# Patient Record
Sex: MALE | Race: WHITE
[De-identification: names, ages, dates, MRNs, and addresses within clinical notes are randomized per-mention and may not be internally consistent; named-entity substitution may affect disease eponyms.]

---

## 2017-01-23 ENCOUNTER — HOSPITAL ENCOUNTER (OUTPATIENT)
Dept: HOSPITAL 45 - C.LAB1850 | Age: 82
Discharge: HOME | End: 2017-01-23
Attending: FAMILY MEDICINE
Payer: COMMERCIAL

## 2017-01-23 DIAGNOSIS — E78.5: ICD-10-CM

## 2017-01-23 DIAGNOSIS — R73.03: ICD-10-CM

## 2017-01-23 DIAGNOSIS — I25.10: ICD-10-CM

## 2017-01-23 DIAGNOSIS — D72.820: Primary | ICD-10-CM

## 2017-01-23 DIAGNOSIS — I10: ICD-10-CM

## 2017-01-23 DIAGNOSIS — I48.0: ICD-10-CM

## 2017-01-23 LAB
ALBUMIN/GLOB SERPL: 1.1 {RATIO} (ref 0.9–2)
ALP SERPL-CCNC: 73 U/L (ref 45–117)
ALT SERPL-CCNC: 31 U/L (ref 12–78)
ANION GAP SERPL CALC-SCNC: 11 MMOL/L (ref 3–11)
AST SERPL-CCNC: 26 U/L (ref 15–37)
BASOPHILS # BLD: 0.04 K/UL (ref 0–0.2)
BASOPHILS NFR BLD: 0.3 %
BUN SERPL-MCNC: 19 MG/DL (ref 7–18)
BUN/CREAT SERPL: 18.7 (ref 10–20)
CALCIUM SERPL-MCNC: 8.9 MG/DL (ref 8.5–10.1)
CHLORIDE SERPL-SCNC: 107 MMOL/L (ref 98–107)
CHOLEST/HDLC SERPL: 3.5 {RATIO}
CO2 SERPL-SCNC: 25 MMOL/L (ref 21–32)
COMPLETE: YES
CREAT SERPL-MCNC: 1 MG/DL (ref 0.6–1.4)
EOSINOPHIL NFR BLD AUTO: 164 K/UL (ref 130–400)
EST. AVERAGE GLUCOSE BLD GHB EST-MCNC: 128 MG/DL
GLOBULIN SER-MCNC: 3.4 GM/DL (ref 2.5–4)
GLUCOSE SERPL-MCNC: 125 MG/DL (ref 70–99)
GLUCOSE UR QL: 40 MG/DL
HCT VFR BLD CALC: 41.6 % (ref 42–52)
IG%: 0.1 %
IMM GRANULOCYTES NFR BLD AUTO: 62.7 %
KETONES UR QL STRIP: 69 MG/DL
LYMPHOCYTES # BLD: 8.96 K/UL (ref 1.2–3.4)
MCH RBC QN AUTO: 31 PG (ref 25–34)
MCHC RBC AUTO-ENTMCNC: 33.9 G/DL (ref 32–36)
MCV RBC AUTO: 91.4 FL (ref 80–100)
MONOCYTES NFR BLD: 6.5 %
NEUTROPHILS # BLD AUTO: 1.6 %
NEUTROPHILS NFR BLD AUTO: 28.8 %
NITRITE UR QL STRIP: 160 MG/DL (ref 0–150)
PH UR: 141 MG/DL (ref 0–200)
PMV BLD AUTO: 11.5 FL (ref 7.4–10.4)
POTASSIUM SERPL-SCNC: 4.2 MMOL/L (ref 3.5–5.1)
RBC # BLD AUTO: 4.55 M/UL (ref 4.7–6.1)
SMUDGE CELLS # BLD: PRESENT 10*3/UL
SODIUM SERPL-SCNC: 143 MMOL/L (ref 136–145)
VERY LOW DENSITY LIPOPROT CALC: 32 MG/DL
WBC # BLD AUTO: 14.3 K/UL (ref 4.8–10.8)

## 2017-02-20 ENCOUNTER — HOSPITAL ENCOUNTER (OUTPATIENT)
Dept: HOSPITAL 45 - C.LAB1850 | Age: 82
Discharge: HOME | End: 2017-02-20
Attending: INTERNAL MEDICINE
Payer: COMMERCIAL

## 2017-02-20 DIAGNOSIS — D72.820: ICD-10-CM

## 2017-02-20 DIAGNOSIS — I48.0: Primary | ICD-10-CM

## 2017-02-20 LAB
INR PPP: 2.1 (ref 0.9–1.1)
PROTHROMBIN TIME: 23.6 SECONDS (ref 9–12)

## 2017-08-29 ENCOUNTER — HOSPITAL ENCOUNTER (OUTPATIENT)
Dept: HOSPITAL 45 - C.LABPVFM | Age: 82
Discharge: HOME | End: 2017-08-29
Attending: FAMILY MEDICINE
Payer: COMMERCIAL

## 2017-08-29 DIAGNOSIS — E78.5: ICD-10-CM

## 2017-08-29 DIAGNOSIS — I69.90: ICD-10-CM

## 2017-08-29 DIAGNOSIS — I10: Primary | ICD-10-CM

## 2017-08-29 DIAGNOSIS — R73.03: ICD-10-CM

## 2017-08-29 LAB
ALBUMIN/GLOB SERPL: 1.1 {RATIO} (ref 0.9–2)
ALP SERPL-CCNC: 73 U/L (ref 45–117)
ALT SERPL-CCNC: 33 U/L (ref 12–78)
ANION GAP SERPL CALC-SCNC: 5 MMOL/L (ref 3–11)
AST SERPL-CCNC: 25 U/L (ref 15–37)
BUN SERPL-MCNC: 16 MG/DL (ref 7–18)
BUN/CREAT SERPL: 14.7 (ref 10–20)
CALCIUM SERPL-MCNC: 9.3 MG/DL (ref 8.5–10.1)
CHLORIDE SERPL-SCNC: 109 MMOL/L (ref 98–107)
CHOLEST/HDLC SERPL: 3.3 {RATIO}
CO2 SERPL-SCNC: 26 MMOL/L (ref 21–32)
CREAT SERPL-MCNC: 1.1 MG/DL (ref 0.6–1.4)
EST. AVERAGE GLUCOSE BLD GHB EST-MCNC: 131 MG/DL
GLOBULIN SER-MCNC: 3.6 GM/DL (ref 2.5–4)
GLUCOSE SERPL-MCNC: 121 MG/DL (ref 70–99)
GLUCOSE UR QL: 36 MG/DL
KETONES UR QL STRIP: 52 MG/DL
NITRITE UR QL STRIP: 160 MG/DL (ref 0–150)
PH UR: 120 MG/DL (ref 0–200)
POTASSIUM SERPL-SCNC: 4 MMOL/L (ref 3.5–5.1)
SODIUM SERPL-SCNC: 140 MMOL/L (ref 136–145)
TSH SERPL-ACNC: 1.66 UIU/ML (ref 0.3–4.5)
VERY LOW DENSITY LIPOPROT CALC: 32 MG/DL

## 2018-01-18 ENCOUNTER — HOSPITAL ENCOUNTER (OUTPATIENT)
Dept: HOSPITAL 45 - C.LABPVFM | Age: 83
Discharge: HOME | End: 2018-01-18
Attending: INTERNAL MEDICINE
Payer: COMMERCIAL

## 2018-01-18 DIAGNOSIS — C91.10: Primary | ICD-10-CM

## 2018-01-18 LAB
ALBUMIN SERPL-MCNC: 3.7 GM/DL (ref 3.4–5)
ALP SERPL-CCNC: 74 U/L (ref 45–117)
ALT SERPL-CCNC: 38 U/L (ref 12–78)
AST SERPL-CCNC: 30 U/L (ref 15–37)
BASOPHILS # BLD: 0.04 K/UL (ref 0–0.2)
BASOPHILS NFR BLD: 0.2 %
BUN SERPL-MCNC: 16 MG/DL (ref 7–18)
CALCIUM SERPL-MCNC: 9 MG/DL (ref 8.5–10.1)
CO2 SERPL-SCNC: 26 MMOL/L (ref 21–32)
CREAT SERPL-MCNC: 1.2 MG/DL (ref 0.6–1.4)
EOS ABS #: 0.21 K/UL (ref 0–0.5)
EOSINOPHIL NFR BLD AUTO: 145 K/UL (ref 130–400)
GLUCOSE SERPL-MCNC: 115 MG/DL (ref 70–99)
HCT VFR BLD CALC: 39.1 % (ref 42–52)
HGB BLD-MCNC: 13 G/DL (ref 14–18)
IG#: 0.03 K/UL (ref 0–0.02)
IMM GRANULOCYTES NFR BLD AUTO: 67.6 %
LYMPHOCYTES # BLD: 11.34 K/UL (ref 1.2–3.4)
MCH RBC QN AUTO: 31 PG (ref 25–34)
MCHC RBC AUTO-ENTMCNC: 33.2 G/DL (ref 32–36)
MCV RBC AUTO: 93.3 FL (ref 80–100)
MONO ABS #: 1.17 K/UL (ref 0.11–0.59)
MONOCYTES NFR BLD: 7 %
NEUT ABS #: 3.98 K/UL (ref 1.4–6.5)
NEUTROPHILS # BLD AUTO: 1.3 %
NEUTROPHILS NFR BLD AUTO: 23.7 %
PMV BLD AUTO: 12.3 FL (ref 7.4–10.4)
POTASSIUM SERPL-SCNC: 4.4 MMOL/L (ref 3.5–5.1)
PROT SERPL-MCNC: 7.6 GM/DL (ref 6.4–8.2)
RED CELL DISTRIBUTION WIDTH CV: 14 % (ref 11.5–14.5)
RED CELL DISTRIBUTION WIDTH SD: 48.1 FL (ref 36.4–46.3)
SODIUM SERPL-SCNC: 140 MMOL/L (ref 136–145)
WBC # BLD AUTO: 16.77 K/UL (ref 4.8–10.8)

## 2018-02-26 ENCOUNTER — HOSPITAL ENCOUNTER (OUTPATIENT)
Dept: HOSPITAL 45 - C.LABPVFM | Age: 83
Discharge: HOME | End: 2018-02-26
Attending: FAMILY MEDICINE
Payer: COMMERCIAL

## 2018-02-26 DIAGNOSIS — R73.03: ICD-10-CM

## 2018-02-26 DIAGNOSIS — E78.5: ICD-10-CM

## 2018-02-26 DIAGNOSIS — Z00.00: Primary | ICD-10-CM

## 2018-02-26 DIAGNOSIS — I10: ICD-10-CM

## 2018-02-26 LAB
ALBUMIN SERPL-MCNC: 3.9 GM/DL (ref 3.4–5)
ALP SERPL-CCNC: 82 U/L (ref 45–117)
ALT SERPL-CCNC: 46 U/L (ref 12–78)
AST SERPL-CCNC: 32 U/L (ref 15–37)
BUN SERPL-MCNC: 18 MG/DL (ref 7–18)
CALCIUM SERPL-MCNC: 8.6 MG/DL (ref 8.5–10.1)
CO2 SERPL-SCNC: 26 MMOL/L (ref 21–32)
CREAT SERPL-MCNC: 1.09 MG/DL (ref 0.6–1.4)
GLUCOSE SERPL-MCNC: 111 MG/DL (ref 70–99)
HBA1C MFR BLD: 6.3 % (ref 4.5–5.6)
KETONES UR QL STRIP: 55 MG/DL
PH UR: 108 MG/DL (ref 0–200)
POTASSIUM SERPL-SCNC: 3.9 MMOL/L (ref 3.5–5.1)
PROT SERPL-MCNC: 7.7 GM/DL (ref 6.4–8.2)
SODIUM SERPL-SCNC: 142 MMOL/L (ref 136–145)

## 2018-07-20 ENCOUNTER — HOSPITAL ENCOUNTER (OUTPATIENT)
Dept: HOSPITAL 45 - C.LABPVFM | Age: 83
Discharge: HOME | End: 2018-07-20
Attending: INTERNAL MEDICINE
Payer: COMMERCIAL

## 2018-07-20 DIAGNOSIS — C91.10: Primary | ICD-10-CM

## 2018-07-20 LAB
ALBUMIN SERPL-MCNC: 3.7 GM/DL (ref 3.4–5)
ALP SERPL-CCNC: 100 U/L (ref 45–117)
ALT SERPL-CCNC: 33 U/L (ref 12–78)
AST SERPL-CCNC: 27 U/L (ref 15–37)
BASOPHILS # BLD: 0.05 K/UL (ref 0–0.2)
BASOPHILS NFR BLD: 0.3 %
BUN SERPL-MCNC: 16 MG/DL (ref 7–18)
CALCIUM SERPL-MCNC: 8.7 MG/DL (ref 8.5–10.1)
CO2 SERPL-SCNC: 25 MMOL/L (ref 21–32)
CREAT SERPL-MCNC: 1.14 MG/DL (ref 0.6–1.4)
EOS ABS #: 0.23 K/UL (ref 0–0.5)
EOSINOPHIL NFR BLD AUTO: 165 K/UL (ref 130–400)
GLUCOSE SERPL-MCNC: 149 MG/DL (ref 70–99)
HCT VFR BLD CALC: 36 % (ref 42–52)
HGB BLD-MCNC: 11.9 G/DL (ref 14–18)
IG#: 0.03 K/UL (ref 0–0.02)
IMM GRANULOCYTES NFR BLD AUTO: 71 %
LYMPHOCYTES # BLD: 14.16 K/UL (ref 1.2–3.4)
MCH RBC QN AUTO: 30.4 PG (ref 25–34)
MCHC RBC AUTO-ENTMCNC: 33.1 G/DL (ref 32–36)
MCV RBC AUTO: 91.8 FL (ref 80–100)
MONO ABS #: 0.81 K/UL (ref 0.11–0.59)
MONOCYTES NFR BLD: 4.1 %
NEUT ABS #: 4.65 K/UL (ref 1.4–6.5)
NEUTROPHILS # BLD AUTO: 1.2 %
NEUTROPHILS NFR BLD AUTO: 23.2 %
PMV BLD AUTO: 12.2 FL (ref 7.4–10.4)
POTASSIUM SERPL-SCNC: 4.2 MMOL/L (ref 3.5–5.1)
PROT SERPL-MCNC: 7.7 GM/DL (ref 6.4–8.2)
RED CELL DISTRIBUTION WIDTH CV: 14.6 % (ref 11.5–14.5)
RED CELL DISTRIBUTION WIDTH SD: 49.5 FL (ref 36.4–46.3)
SODIUM SERPL-SCNC: 139 MMOL/L (ref 136–145)
WBC # BLD AUTO: 19.93 K/UL (ref 4.8–10.8)

## 2018-07-24 ENCOUNTER — HOSPITAL ENCOUNTER (OUTPATIENT)
Dept: HOSPITAL 45 - C.LAB1850 | Age: 83
Discharge: HOME | End: 2018-07-24
Payer: COMMERCIAL

## 2018-07-24 DIAGNOSIS — M25.474: Primary | ICD-10-CM

## 2018-07-24 LAB
BASOPHILS # BLD: 0.05 K/UL (ref 0–0.2)
BASOPHILS NFR BLD: 0.2 %
EOS ABS #: 0.2 K/UL (ref 0–0.5)
EOSINOPHIL NFR BLD AUTO: 170 K/UL (ref 130–400)
HCT VFR BLD CALC: 35.5 % (ref 42–52)
HGB BLD-MCNC: 11.7 G/DL (ref 14–18)
IG#: 0.05 K/UL (ref 0–0.02)
IMM GRANULOCYTES NFR BLD AUTO: 65.2 %
LYMPHOCYTES # BLD: 13.98 K/UL (ref 1.2–3.4)
MCH RBC QN AUTO: 30.2 PG (ref 25–34)
MCHC RBC AUTO-ENTMCNC: 33 G/DL (ref 32–36)
MCV RBC AUTO: 91.5 FL (ref 80–100)
MONO ABS #: 1.12 K/UL (ref 0.11–0.59)
MONOCYTES NFR BLD: 5.2 %
NEUT ABS #: 6.05 K/UL (ref 1.4–6.5)
NEUTROPHILS # BLD AUTO: 0.9 %
NEUTROPHILS NFR BLD AUTO: 28.3 %
PMV BLD AUTO: 11.9 FL (ref 7.4–10.4)
RED CELL DISTRIBUTION WIDTH CV: 14.4 % (ref 11.5–14.5)
RED CELL DISTRIBUTION WIDTH SD: 48.1 FL (ref 36.4–46.3)
WBC # BLD AUTO: 21.45 K/UL (ref 4.8–10.8)

## 2018-07-27 ENCOUNTER — HOSPITAL ENCOUNTER (EMERGENCY)
Dept: HOSPITAL 45 - C.EDB | Age: 83
Discharge: HOME | End: 2018-07-27
Payer: COMMERCIAL

## 2018-07-27 VITALS — SYSTOLIC BLOOD PRESSURE: 154 MMHG | OXYGEN SATURATION: 97 % | HEART RATE: 55 BPM | DIASTOLIC BLOOD PRESSURE: 82 MMHG

## 2018-07-27 VITALS
HEIGHT: 67.01 IN | HEIGHT: 67.01 IN | WEIGHT: 185.19 LBS | BODY MASS INDEX: 29.07 KG/M2 | WEIGHT: 185.19 LBS | BODY MASS INDEX: 29.07 KG/M2

## 2018-07-27 VITALS — TEMPERATURE: 97.88 F

## 2018-07-27 DIAGNOSIS — Z79.01: ICD-10-CM

## 2018-07-27 DIAGNOSIS — L03.115: Primary | ICD-10-CM

## 2018-07-27 LAB
BASOPHILS # BLD: 0.04 K/UL (ref 0–0.2)
BASOPHILS NFR BLD: 0.2 %
BUN SERPL-MCNC: 17 MG/DL (ref 7–18)
CALCIUM SERPL-MCNC: 9.2 MG/DL (ref 8.5–10.1)
CO2 SERPL-SCNC: 27 MMOL/L (ref 21–32)
CREAT SERPL-MCNC: 1.04 MG/DL (ref 0.6–1.4)
EOS ABS #: 0.23 K/UL (ref 0–0.5)
EOSINOPHIL NFR BLD AUTO: 193 K/UL (ref 130–400)
GLUCOSE SERPL-MCNC: 128 MG/DL (ref 70–99)
HCT VFR BLD CALC: 37.5 % (ref 42–52)
HGB BLD-MCNC: 12.5 G/DL (ref 14–18)
IG#: 0.04 K/UL (ref 0–0.02)
IMM GRANULOCYTES NFR BLD AUTO: 67.2 %
INR PPP: 2.8 (ref 0.9–1.1)
LYMPHOCYTES # BLD: 15.21 K/UL (ref 1.2–3.4)
MCH RBC QN AUTO: 30.3 PG (ref 25–34)
MCHC RBC AUTO-ENTMCNC: 33.3 G/DL (ref 32–36)
MCV RBC AUTO: 91 FL (ref 80–100)
MONO ABS #: 1.11 K/UL (ref 0.11–0.59)
MONOCYTES NFR BLD: 4.9 %
NEUT ABS #: 6.02 K/UL (ref 1.4–6.5)
NEUTROPHILS # BLD AUTO: 1 %
NEUTROPHILS NFR BLD AUTO: 26.5 %
PMV BLD AUTO: 11.3 FL (ref 7.4–10.4)
POTASSIUM SERPL-SCNC: 4.5 MMOL/L (ref 3.5–5.1)
RED CELL DISTRIBUTION WIDTH CV: 14.3 % (ref 11.5–14.5)
RED CELL DISTRIBUTION WIDTH SD: 47.1 FL (ref 36.4–46.3)
SODIUM SERPL-SCNC: 137 MMOL/L (ref 136–145)
WBC # BLD AUTO: 22.65 K/UL (ref 4.8–10.8)

## 2018-07-27 NOTE — EMERGENCY ROOM VISIT NOTE
History


Report prepared by Shruthi:  Janiya Young


Under the Supervision of:  Dr. Jose Chavez M.D.


First contact with patient:  17:51


Chief Complaint:  WOUND INFECTION


Stated Complaint:  SWELLING AND PAIN IN RIGHT FOOT





History of Present Illness


The patient is an 84 year old male who presents to the Emergency Room with 

complaints of a worsening wound infection starting 3 days ago. The patient 

states that he went to the orthopedist 2 days ago since his right foot was red 

and swollen. He states that they diagnosed him with cellulitis and started him 

on Keflex. He reports that he took his first dose that evening. He states that 

the redness has increased now on the inside of his foot, though better on the 

outside. He states that they called the orthopedist and they recommended he 

come to the ED to be evaluated. He currently rates his pain as a 7/10 in 

severity. The patient complains of his foot feeling rounder when he walks on 

it. The patient denies cutting his foot, leg pain, and fever. The patient notes 

that he takes Coumadin.





   Source of History:  patient


   Onset:  3 days ago


   Position:  foot (right)


   Symptom Intensity:  7/10


   Timing:  worsening


   Associated Symptoms:  No fevers


Note:


The patient complains of his foot feeling rounder when he walks on it. The 

patient denies cutting his foot and leg pain.





Review of Systems


See HPI for pertinent positives and negatives.  A total of ten systems were 

reviewed and were otherwise negative.





Past Medical & Surgical


Medical Problems:


(1) Arthritis


(2) CLL (chronic lymphocytic leukemia)


(3) HTN (hypertension)


(4) Hx of myocardial infarction








Family History





No pertinent family history





Social History


Smoking Status:  Never Smoker


Marital Status:  


Housing Status:  lives with significant other


Occupation Status:  retired





Current/Historical Medications


Scheduled


Doxycycline Hyclate (Vibramycin), 100 MG PO BID





Miscellaneous Medications


Carvedilol (Coreg *)


Clopidogrel (Plavix)


Ezetimibe (Zetia)


Furosemide (Lasix)


Lisinopril (Zestril)


Potassium Chloride (Micro-K Ext Rel)


Pravastatin (Pravachol )


Warfarin Sod (Coumadin *)


Warfarin Sod (Coumadin *)





Allergies


Coded Allergies:  


     No Known Allergies (Unverified , 8/12/14)





Physical Exam


Vital Signs











  Date Time  Temp Pulse Resp B/P (MAP) Pulse Ox O2 Delivery O2 Flow Rate FiO2


 


7/27/18 20:15  55  154/82 97   


 


7/27/18 18:45  55 14 165/74 97 Room Air  


 


7/27/18 16:16 36.6 82 17 125/77 96 Room Air  











Physical Exam


GENERAL: Awake, alert, well-appearing, in no distress


HENT: Normocephalic, atraumatic. Oropharynx unremarkable.


EYES: Normal conjunctiva. Sclera non-icteric.


NECK: Supple. No nuchal rigidity. 


RESPIRATORY: Clear to auscultation. No wheezes. Normal respiratory effort.


CARDIAC: Normal rate.  Extremities warm and well perfused.


GI: Soft, non-distended. No tenderness to palpation. No rebound or guarding. No 

masses.


RECTAL: Deferred.


MUSCULOSKELETAL: Atraumatic. Chest examination reveals no tenderness. 


LOWER EXTREMITIES: Calves are equal size bilaterally and non-tender. Redness 

and mild swelling of the right foot diffusely without crepitus; some mild 

tenderness here.  No fluctuance. No contusions or lacerations. 


NEURO: Normal sensorium. No sensory or motor deficits noted. 


SKIN: Warm and dry. No rash or jaundice noted.





Medical Decision & Procedures


Laboratory Results


7/27/18 18:35








Red Blood Count 4.12, Mean Corpuscular Volume 91.0, Mean Corpuscular Hemoglobin 

30.3, Mean Corpuscular Hemoglobin Concent 33.3, Mean Platelet Volume 11.3, 

Neutrophils (%) (Auto) 26.5, Lymphocytes (%) (Auto) 67.2, Monocytes (%) (Auto) 

4.9, Eosinophils (%) (Auto) 1.0, Basophils (%) (Auto) 0.2, Neutrophils # (Auto) 

6.02, Lymphocytes # (Auto) 15.21, Monocytes # (Auto) 1.11, Eosinophils # (Auto) 

0.23, Basophils # (Auto) 0.04





7/27/18 18:35

















Test


  7/27/18


18:35


 


White Blood Count


  22.65 K/uL


(4.8-10.8)


 


Red Blood Count


  4.12 M/uL


(4.7-6.1)


 


Hemoglobin


  12.5 g/dL


(14.0-18.0)


 


Hematocrit 37.5 % (42-52) 


 


Mean Corpuscular Volume


  91.0 fL


()


 


Mean Corpuscular Hemoglobin


  30.3 pg


(25-34)


 


Mean Corpuscular Hemoglobin


Concent 33.3 g/dl


(32-36)


 


Platelet Count


  193 K/uL


(130-400)


 


Mean Platelet Volume


  11.3 fL


(7.4-10.4)


 


Neutrophils (%) (Auto) 26.5 % 


 


Lymphocytes (%) (Auto) 67.2 % 


 


Monocytes (%) (Auto) 4.9 % 


 


Eosinophils (%) (Auto) 1.0 % 


 


Basophils (%) (Auto) 0.2 % 


 


Neutrophils # (Auto)


  6.02 K/uL


(1.4-6.5)


 


Lymphocytes # (Auto)


  15.21 K/uL


(1.2-3.4)


 


Monocytes # (Auto)


  1.11 K/uL


(0.11-0.59)


 


Eosinophils # (Auto)


  0.23 K/uL


(0-0.5)


 


Basophils # (Auto)


  0.04 K/uL


(0-0.2)


 


RDW Standard Deviation


  47.1 fL


(36.4-46.3)


 


RDW Coefficient of Variation


  14.3 %


(11.5-14.5)


 


Immature Granulocyte % (Auto) 0.2 % 


 


Immature Granulocyte # (Auto)


  0.04 K/uL


(0.00-0.02)


 


Prothrombin Time


  28.9 SECONDS


(9.0-12.0)


 


Prothromb Time International


Ratio 2.8 (0.9-1.1) 


 


 


Anion Gap


  6.0 mmol/L


(3-11)


 


Est Creatinine Clear Calc


Drug Dose 54.8 ml/min 


 


 


Estimated GFR (


American) 76.1 


 


 


Estimated GFR (Non-


American 65.6 


 


 


BUN/Creatinine Ratio 15.9 (10-20) 


 


Calcium Level


  9.2 mg/dl


(8.5-10.1)





Laboratory results reviewed by me





Medications Administered











 Medications


  (Trade)  Dose


 Ordered  Sig/Lindsay


 Route  Start Time


 Stop Time Status Last Admin


Dose Admin


 


 Doxycycline


 Hyclate


  (Vibramycin Cap)  100 mg  NOW  STAT


 PO  7/27/18 19:26


 7/27/18 19:27 DC 7/27/18 19:32


100 MG











ED Course


1753: The patient was evaluated in room C4. A complete history and physical 

exam was performed.





1916: I reevaluated the patient. Discussed results and discharge instructions: 

He verbalized understanding and agreement. The patient is ready for discharge. 





1926: Ordered Vibramycin Cap 100 mg PO.





Medical Decision


Differential diagnosis:


Etiologies such as cellulitis, abscess, MRSA infection, DVT, necrotizing 

fasciitis, dermatitis, drug eruption, as well as others were entertained.





Patient presents with worsening cellulitis of his right foot.  Out patient 

evaluation by orthopedics with x-rays and MRIs 2 days ago.  On Keflex but now 

more spread of the redness overlying the medial aspect of his foot.  No 

crepitus.  No spread up the leg.  No fevers or otherwise systemic symptoms.  

Patient otherwise well-appearing.  No evidence of fluctuance or abscess.  Doubt 

necrotizing fasciitis.  Given recent imaging will not repeat imaging here.  

Repeat labs were completed, slight increase in leukocytosis.  Patient could 

have possible resistant infection including MRSA.  Given this will need to 

broaden antibiotics.  Discussed the patient's options of outpatient versus 

inpatient antibiotics.  Will trial a course of doxycycline after discussion 

with him as he wanted to try outpatient therapy.  Discussed interaction with 

warfarin.  Will need follow-up INR testing prescription provided; baseline INR 

provided. Discussed return criteria with him and family.  He felt comfortable 

and wished to go home on oral antibiotics.  Discharge.





Medication Reconcilliation


Current Medication List:  was personally reviewed by me





Blood Pressure Screening


Patient's blood pressure:  Elevated blood pressure


Blood pressure disposition:  Elevated BP felt to be situational





Impression





 Primary Impression:  


 Cellulitis of foot, right





Scribe Attestation


The scribe's documentation has been prepared under my direction and personally 

reviewed by me in its entirety. I confirm that the note above accurately 

reflects all work, treatment, procedures, and medical decision making performed 

by me.





Departure Information


Dispostion


Home / Self-Care





Prescriptions





Doxycycline Hyclate (VIBRAMYCIN) 100 Mg Cap


100 MG PO BID for 10 Days, #20 CAP


   Prov: Jose Chavez M.D.         7/27/18





Referrals


Anthony Head D.O. (PCP)





Forms


HOME CARE DOCUMENTATION FORM,                                                 

               IMPORTANT VISIT INFORMATION, WORK / SCHOOL INSTRUCTIONS





Patient Instructions


My [facility]





Additional Instructions





Continue to maintain hydration.  Utilized the antibiotic given twice a day.  

Would stop your other antibiotic.  Continue to have outpatient follow-up 

regarding your foot cellulitis.  He will need follow-up for Coumadin INR 

testing on Monday.  Please have this completed.  Your antibiotic may change 

your INR levels.  If you experience worsening of your cellulitis/redness or 

other symptoms of increased fatigue or fever please return for reevaluation 

sooner.